# Patient Record
Sex: FEMALE | Race: ASIAN | NOT HISPANIC OR LATINO | URBAN - METROPOLITAN AREA
[De-identification: names, ages, dates, MRNs, and addresses within clinical notes are randomized per-mention and may not be internally consistent; named-entity substitution may affect disease eponyms.]

---
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
## 2022-07-06 NOTE — ED PEDIATRIC TRIAGE NOTE - NS ED NURSE DIRECT TO ROOM YN
Your labs are normal.  Your EKG is normal.  Leg swelling may be due to heat.  Wear compression stockings, keep legs elevated, exercises also beneficial.   
Yes

## 2022-07-07 ENCOUNTER — EMERGENCY (EMERGENCY)
Facility: HOSPITAL | Age: 22
LOS: 1 days | Discharge: PSYCHIATRIC OR SUBSTANCE USE FACILITY | End: 2022-07-07
Attending: EMERGENCY MEDICINE | Admitting: EMERGENCY MEDICINE
Payer: COMMERCIAL

## 2022-07-07 VITALS
DIASTOLIC BLOOD PRESSURE: 65 MMHG | TEMPERATURE: 98 F | RESPIRATION RATE: 16 BRPM | HEART RATE: 95 BPM | OXYGEN SATURATION: 98 % | SYSTOLIC BLOOD PRESSURE: 116 MMHG

## 2022-07-07 VITALS
HEIGHT: 68.9 IN | OXYGEN SATURATION: 97 % | SYSTOLIC BLOOD PRESSURE: 128 MMHG | DIASTOLIC BLOOD PRESSURE: 74 MMHG | WEIGHT: 193.35 LBS | TEMPERATURE: 99 F | HEART RATE: 115 BPM | RESPIRATION RATE: 17 BRPM

## 2022-07-07 DIAGNOSIS — F32.A DEPRESSION, UNSPECIFIED: ICD-10-CM

## 2022-07-07 LAB
ALBUMIN SERPL ELPH-MCNC: 4.3 G/DL — SIGNIFICANT CHANGE UP (ref 3.3–5)
ALP SERPL-CCNC: 56 U/L — SIGNIFICANT CHANGE UP (ref 30–120)
ALT FLD-CCNC: 38 U/L DA — SIGNIFICANT CHANGE UP (ref 10–60)
AMPHET UR-MCNC: NEGATIVE — SIGNIFICANT CHANGE UP
ANION GAP SERPL CALC-SCNC: 13 MMOL/L — SIGNIFICANT CHANGE UP (ref 5–17)
APAP SERPL-MCNC: <1 — SIGNIFICANT CHANGE UP (ref 10–30)
APPEARANCE UR: CLEAR — SIGNIFICANT CHANGE UP
AST SERPL-CCNC: 14 U/L — SIGNIFICANT CHANGE UP (ref 10–40)
BACTERIA # UR AUTO: ABNORMAL
BARBITURATES UR SCN-MCNC: NEGATIVE — SIGNIFICANT CHANGE UP
BASOPHILS # BLD AUTO: 0.04 K/UL — SIGNIFICANT CHANGE UP (ref 0–0.2)
BASOPHILS NFR BLD AUTO: 0.4 % — SIGNIFICANT CHANGE UP (ref 0–2)
BENZODIAZ UR-MCNC: NEGATIVE — SIGNIFICANT CHANGE UP
BILIRUB SERPL-MCNC: 0.6 MG/DL — SIGNIFICANT CHANGE UP (ref 0.2–1.2)
BILIRUB UR-MCNC: NEGATIVE — SIGNIFICANT CHANGE UP
BUN SERPL-MCNC: 14 MG/DL — SIGNIFICANT CHANGE UP (ref 7–23)
CALCIUM SERPL-MCNC: 9.2 MG/DL — SIGNIFICANT CHANGE UP (ref 8.4–10.5)
CHLORIDE SERPL-SCNC: 102 MMOL/L — SIGNIFICANT CHANGE UP (ref 96–108)
CO2 SERPL-SCNC: 22 MMOL/L — SIGNIFICANT CHANGE UP (ref 22–31)
COCAINE METAB.OTHER UR-MCNC: NEGATIVE — SIGNIFICANT CHANGE UP
COLOR SPEC: YELLOW — SIGNIFICANT CHANGE UP
CREAT SERPL-MCNC: 0.81 MG/DL — SIGNIFICANT CHANGE UP (ref 0.5–1.3)
DIFF PNL FLD: ABNORMAL
EGFR: 107 ML/MIN/1.73M2 — SIGNIFICANT CHANGE UP
EOSINOPHIL # BLD AUTO: 0.22 K/UL — SIGNIFICANT CHANGE UP (ref 0–0.5)
EOSINOPHIL NFR BLD AUTO: 2 % — SIGNIFICANT CHANGE UP (ref 0–6)
EPI CELLS # UR: SIGNIFICANT CHANGE UP
ETHANOL SERPL-MCNC: <10 MG/DL — SIGNIFICANT CHANGE UP (ref 0–3)
GLUCOSE SERPL-MCNC: 98 MG/DL — SIGNIFICANT CHANGE UP (ref 70–99)
GLUCOSE UR QL: NEGATIVE MG/DL — SIGNIFICANT CHANGE UP
HCG UR QL: NEGATIVE — SIGNIFICANT CHANGE UP
HCT VFR BLD CALC: 39.1 % — SIGNIFICANT CHANGE UP (ref 34.5–45)
HGB BLD-MCNC: 13.1 G/DL — SIGNIFICANT CHANGE UP (ref 11.5–15.5)
IMM GRANULOCYTES NFR BLD AUTO: 0.4 % — SIGNIFICANT CHANGE UP (ref 0–1.5)
KETONES UR-MCNC: ABNORMAL
LEUKOCYTE ESTERASE UR-ACNC: ABNORMAL
LYMPHOCYTES # BLD AUTO: 27.4 % — SIGNIFICANT CHANGE UP (ref 13–44)
LYMPHOCYTES # BLD AUTO: 3.01 K/UL — SIGNIFICANT CHANGE UP (ref 1–3.3)
MCHC RBC-ENTMCNC: 28.9 PG — SIGNIFICANT CHANGE UP (ref 27–34)
MCHC RBC-ENTMCNC: 33.5 GM/DL — SIGNIFICANT CHANGE UP (ref 32–36)
MCV RBC AUTO: 86.1 FL — SIGNIFICANT CHANGE UP (ref 80–100)
METHADONE UR-MCNC: NEGATIVE — SIGNIFICANT CHANGE UP
MONOCYTES # BLD AUTO: 0.47 K/UL — SIGNIFICANT CHANGE UP (ref 0–0.9)
MONOCYTES NFR BLD AUTO: 4.3 % — SIGNIFICANT CHANGE UP (ref 2–14)
NEUTROPHILS # BLD AUTO: 7.2 K/UL — SIGNIFICANT CHANGE UP (ref 1.8–7.4)
NEUTROPHILS NFR BLD AUTO: 65.5 % — SIGNIFICANT CHANGE UP (ref 43–77)
NITRITE UR-MCNC: NEGATIVE — SIGNIFICANT CHANGE UP
NRBC # BLD: 0 /100 WBCS — SIGNIFICANT CHANGE UP (ref 0–0)
OPIATES UR-MCNC: NEGATIVE — SIGNIFICANT CHANGE UP
PCP SPEC-MCNC: SIGNIFICANT CHANGE UP
PCP UR-MCNC: NEGATIVE — SIGNIFICANT CHANGE UP
PH UR: 6 — SIGNIFICANT CHANGE UP (ref 5–8)
PLATELET # BLD AUTO: 318 K/UL — SIGNIFICANT CHANGE UP (ref 150–400)
POTASSIUM SERPL-MCNC: 3.3 MMOL/L — LOW (ref 3.5–5.3)
POTASSIUM SERPL-SCNC: 3.3 MMOL/L — LOW (ref 3.5–5.3)
PROT SERPL-MCNC: 7.7 G/DL — SIGNIFICANT CHANGE UP (ref 6–8.3)
PROT UR-MCNC: 15 MG/DL
RBC # BLD: 4.54 M/UL — SIGNIFICANT CHANGE UP (ref 3.8–5.2)
RBC # FLD: 12.2 % — SIGNIFICANT CHANGE UP (ref 10.3–14.5)
RBC CASTS # UR COMP ASSIST: ABNORMAL /HPF (ref 0–4)
SALICYLATES SERPL-MCNC: 0.6 MG/DL — LOW (ref 2.8–20)
SARS-COV-2 RNA SPEC QL NAA+PROBE: SIGNIFICANT CHANGE UP
SODIUM SERPL-SCNC: 137 MMOL/L — SIGNIFICANT CHANGE UP (ref 135–145)
SP GR SPEC: 1.01 — SIGNIFICANT CHANGE UP (ref 1.01–1.02)
THC UR QL: NEGATIVE — SIGNIFICANT CHANGE UP
UROBILINOGEN FLD QL: NEGATIVE MG/DL — SIGNIFICANT CHANGE UP
WBC # BLD: 10.98 K/UL — HIGH (ref 3.8–10.5)
WBC # FLD AUTO: 10.98 K/UL — HIGH (ref 3.8–10.5)
WBC UR QL: ABNORMAL

## 2022-07-07 PROCEDURE — 81001 URINALYSIS AUTO W/SCOPE: CPT

## 2022-07-07 PROCEDURE — 80053 COMPREHEN METABOLIC PANEL: CPT

## 2022-07-07 PROCEDURE — 99285 EMERGENCY DEPT VISIT HI MDM: CPT

## 2022-07-07 PROCEDURE — 81025 URINE PREGNANCY TEST: CPT

## 2022-07-07 PROCEDURE — 99053 MED SERV 10PM-8AM 24 HR FAC: CPT

## 2022-07-07 PROCEDURE — 36415 COLL VENOUS BLD VENIPUNCTURE: CPT

## 2022-07-07 PROCEDURE — 93010 ELECTROCARDIOGRAM REPORT: CPT

## 2022-07-07 PROCEDURE — 80307 DRUG TEST PRSMV CHEM ANLYZR: CPT

## 2022-07-07 PROCEDURE — 87086 URINE CULTURE/COLONY COUNT: CPT

## 2022-07-07 PROCEDURE — 93005 ELECTROCARDIOGRAM TRACING: CPT

## 2022-07-07 PROCEDURE — 87635 SARS-COV-2 COVID-19 AMP PRB: CPT

## 2022-07-07 PROCEDURE — 85025 COMPLETE CBC W/AUTO DIFF WBC: CPT

## 2022-07-07 RX ORDER — OMEPRAZOLE 10 MG/1
1 CAPSULE, DELAYED RELEASE ORAL
Qty: 0 | Refills: 0 | DISCHARGE

## 2022-07-07 RX ORDER — QUETIAPINE FUMARATE 200 MG/1
0 TABLET, FILM COATED ORAL
Qty: 0 | Refills: 0 | DISCHARGE

## 2022-07-07 RX ORDER — ACETAMINOPHEN 500 MG
650 TABLET ORAL ONCE
Refills: 0 | Status: COMPLETED | OUTPATIENT
Start: 2022-07-07 | End: 2022-07-07

## 2022-07-07 RX ORDER — POTASSIUM CHLORIDE 20 MEQ
20 PACKET (EA) ORAL ONCE
Refills: 0 | Status: COMPLETED | OUTPATIENT
Start: 2022-07-07 | End: 2022-07-07

## 2022-07-07 RX ADMIN — Medication 20 MILLIEQUIVALENT(S): at 05:42

## 2022-07-07 RX ADMIN — Medication 650 MILLIGRAM(S): at 11:24

## 2022-07-07 NOTE — ED PROVIDER NOTE - SKIN, MLM
Skin normal color for race, warm, dry and intact. No evidence of rash. Skin normal color for race, warm, dry and intact. No evidence of rash. Multiple superficial linear abrasions on volar aspect of dital left forearm

## 2022-07-07 NOTE — ED BEHAVIORAL HEALTH ASSESSMENT NOTE - HPI (INCLUDE ILLNESS QUALITY, SEVERITY, DURATION, TIMING, CONTEXT, MODIFYING FACTORS, ASSOCIATED SIGNS AND SYMPTOMS)
Patient is a 22 yo F, has boyfriend who lives in [place], domiciled with mother and father in [place], [place], in private residence, enrolled in college, with psychiatric history of depression and unspecified anxiety, no PPH, no self-reported suicide attempts but history of suicidal gestures (reports taking a bottle of Ambien and Tylenol in March 2022 so she could "fall asleep"), +SIB by cutting, no known h/o violence, trauma, substance use, or legal issues, PMH significant for asthma, in outpatient treatment with Dr. Martines and a therapist (does not remember clinician's name since they met only twice), on Lorazepam 1 mg and quetiapine 300 mg, BIB mother after ingesting 10-12 tablets of Ativan 1 mg and cutting her arm.     On assessment, pt reports wanting to die earlier [facility] when feeling distressed, at which point she cut her arm before ingesting up to 12 mg of Ativan since that "wouldn't take as long". However, she then reports knowing this amount of Ativan would not end her life, but instead allow her to fall asleep. After ingesting the pills, she reports disclosing to her mom what she had done, who then "forced [her]" to come to the ED, which the patient did not want to do. The patient reports her overdose and self-injurious behavior were done impulsively and not previously planned, but does endorse having thoughts of killing herself by cutting herself or overdosing every other day. [facility]esting a "bottle" each of Tylenol and Ambien in March 2022, at which time she was taken to the ED, but did not require medical hospitalization. She also reports not having spoken with psychiatry during that visit, but states that she did not overdose then with the intention to end her life but that this was done "unconsciously" and did not remember the incident until she saw the empty bottles the following morning. The patient reports feeling stressed recently because her planned trip to [place] with her boyfriend at the end of the month fell through and that she feels inundated with "problems" that are difficult to solve. She reports having a positive therapeutic relationship with her psychiatrist, but admits to not having told her the extent of her SI or suicidal behaviors.     The patient denies HI, A/VH, PI, camilo, or substance use.     This writer discussed hospitalization with the patient and after speaking with her mother about this plan, agreed to sign in voluntarily.

## 2022-07-07 NOTE — ED ADULT NURSE REASSESSMENT NOTE - GENERAL PATIENT STATE
mother present/family/SO at bedside
pt offered breakfast, declined at this time.
comfortable appearance/cooperative

## 2022-07-07 NOTE — ED PROVIDER NOTE - CLINICAL SUMMARY MEDICAL DECISION MAKING FREE TEXT BOX
Patient with anxiety and depression took pills and ut herself. Will get medical clearance and psych eval

## 2022-07-07 NOTE — ED BEHAVIORAL HEALTH ASSESSMENT NOTE - RISK ASSESSMENT
Moderate Acute Suicide Risk Patient has multiple risk factors that elevate her risk of harm to self including recent suicidal and self-injurious behaviors, recurrent active SI with method, impulsivity, and self-reported depressive symptoms. Her protective factors are no e/o psychosis, no HI, no e/o psychosis or camilo, in outpatient treatment, support from family, and h/o no substance abuse. Given the above, this writer considers the patient to be  in acute risk of harm to self and requires inpatient hospitalization for safety.

## 2022-07-07 NOTE — ED BEHAVIORAL HEALTH ASSESSMENT NOTE - SUMMARY
Patient is a 20 yo F, has boyfriend who lives in [place], domiciled with mother and father in [place], [place], in private residence, enrolled in college, with psychiatric history of depression and unspecified anxiety, no PPH, no self-reported suicide attempts but history of suicidal gestures (reports taking a bottle of Ambien and Tylenol in March 2022 so she could "fall asleep"), +SIB by cutting, no known h/o violence, trauma, substance use, or legal issues, PMH significant for asthma, in outpatient treatment with Dr. Martines and a therapist (does not remember clinician's name since they met only twice), on Lorazepam 1 mg and quetiapine 300 mg, BIB mother after ingesting 10-12 tablets of Ativan 1 mg and cutting her arm. Patient reports having carried out the above actions earlier tonight in the setting of wanting to die and endorses having active SI every other day with thoughts of cutting herself or overdosing for the past several months. In addition to tonight's events, she reports a history of self injurious and suicidal behavior, which she has not disclosed to her psychiatrist. Curiously, despite pt's self-reported benzo overdose and adherence with her prescribed dose of Ativan 1 mg at bedtime, her utox in the ED was negative for benzos. Despite this, the patient's reported recurrent active SI, impulsivity, recent SIB, and suicidal gestures elevate her risk of harm to self and she would benefit form hospitalization for safety and stabilization. Patient has agreed to sign in voluntarily, but if she recants, she meets criteria for involuntary admission. Plan discussed with ED attending, Dr. Tomlinson.

## 2022-07-07 NOTE — ED ADULT NURSE NOTE - HPI (INCLUDE ILLNESS QUALITY, SEVERITY, DURATION, TIMING, CONTEXT, MODIFYING FACTORS, ASSOCIATED SIGNS AND SYMPTOMS)
pt reports she took "10 Lorazepam pills about 1 and 1/2 hours prior to arrival; 1 mg tabs." pt reports "I can't control my emotions" pt also presents with superficial "cuts" to her left wrist. no bleeding noted; pt self reports h/o of "accidental" ingestion of ambient and Tylenol in the past, but denied suicidal intent. pt brought to ED by her mother. belongings given to mother (Cell phone, clothing, wallet and jewelry). pt placed on 1:1 observation for pt safety

## 2022-07-07 NOTE — ED ADULT NURSE NOTE - TEMPLATE
Normal XR, normal CT scan  Referral for orthopedics  Prescriptions  Please follow up with your PCP to have blood pressure rechecked   Psych/Behavioral

## 2022-07-07 NOTE — ED ADULT TRIAGE NOTE - CHIEF COMPLAINT QUOTE
I have depression and sometimes I can not control what I am doing and I took 10pills of lorazepam 1mg. I also cut my wrist yesterday because I was so mad and I don't have anywhere to take out the anger. I kind of still want to hurt myself.

## 2022-07-07 NOTE — ED BEHAVIORAL HEALTH ASSESSMENT NOTE - DETAILS
This writer did not speak with patient's mother but patient discussed plan with her. No bed available yet See HPI

## 2022-07-07 NOTE — ED BEHAVIORAL HEALTH ASSESSMENT NOTE - PSYCHIATRIC ISSUES AND PLAN (INCLUDE STANDING AND PRN MEDICATION)
Hold off on restarting outpatient regimen Ativan/Seroquel in light of recent self reported overdose. Maintain on 1:1 while awaiting inpatient bed.

## 2022-07-07 NOTE — ED BEHAVIORAL HEALTH ASSESSMENT NOTE - DIFFERENTIAL
Unspecified depression; R/O persistent depressive disorder vs characterological depression vs MDD, recurrent.   Borderline personality organization with borderline traits

## 2022-07-07 NOTE — ED BEHAVIORAL HEALTH ASSESSMENT NOTE - DESCRIPTION
Asthma Has boyfriend in [place]. Lives with parents in [place], [place], and commutes to [facility] Since arriving to the ED, the patient has been maintained on 1:1 and has remained in good behavioral control. Curiously, her utox was negative for benzodiazepines despite the patient's above report of her benzo overdose and being adherent with Ativan 1 mg at bedtime prior to today.

## 2022-07-07 NOTE — ED PROVIDER NOTE - CHPI ED SYMPTOMS NEG
no agitation/no disorientation/no hallucinations/no homicidal/no weakness/no weight loss/no change in level of consciousness/no paranoia

## 2022-07-07 NOTE — ED ADULT NURSE NOTE - NSICDXPASTMEDICALHX_GEN_ALL_CORE_FT
PAST MEDICAL HISTORY:  Anxiety     Asthma     Depression     Enthesopathy     Seasonal allergies

## 2022-07-07 NOTE — ED BEHAVIORAL HEALTH NOTE - BEHAVIORAL HEALTH NOTE
ED AM Reassessment     Patient waiting for inpatient psych bed; remains on 9.13 status. Patient AAOx4, calm and cooperative in ED + resting in bed comfortably. Mother is at bedside. Patient remains on 1:1; remains in good behavioral control. ED AM Reassessment     Patient waiting for inpatient psych bed; remains on 9.13 status. Patient AAOx4, calm and cooperative in ED + resting in bed comfortably. Mother is at bedside. Patient remains on 1:1; remains in good behavioral control.      Attending addendum:  Patient reassessed this morning. She expresses ambivalence about admission, stating she feels tired and would like to go home, but ultimately agrees to stay. She continues to appear very dysphoric and flat. With regard to her behavior last night, she reports thinking "I don't care if I do" and leaving the outcome up to God. States if she could go back, she would probably take the same action, although also stating that right now she is not having active thoughts about dying or harming herself. She reports this overdose was intentional, whereas her previous overdose on Ambien and Ativan Tylenol in March 2022 was not.    Plan:  - voluntary admit 9.13 pending bed availability  - outpatient meds include omeprazole PRN, asthma inhaler PRN, oral contraceptive, and Seroquel 300mg BID  - recommend holding home meds for now. Defer to inpatient team.

## 2022-07-07 NOTE — ED PROVIDER NOTE - PROGRESS NOTE DETAILS
telepsych relates pt to be xferred to [facility] for dr catherine pt no longer wants to sign voluntary, d/w telepsych, they relate patient needs to be admitted involuntary status

## 2022-07-07 NOTE — ED ADULT NURSE REASSESSMENT NOTE - NS ED NURSE REASSESS COMMENT FT1
pt updated on POC, questions answered. reporting she doesn't recall what happened last night leading her to come to ED. pt offered food/drink, still declines. c/o headache. MD made aware
spoke with pt's mother; Mandarin  Jose Francisco ([phone]) used for interview. pt's mother reports pt told her she "took too much medication"; she reports pt had/has "hard time sleeping" and she denied pt endorsed suicidal ideations. she reports pt had "an argument with her boyfriend (lives in [place]); and the father was yelling" yesterday and that upset the pt. pt's mother also reports pt it an episode of "took too much medication" around February or March of this year. she reports pt has not had any psychiatric hospital admissions. she states the "school" diagnosed her with depression and that the pt takes care of her own medications. explained to pt's mother the procedure for evaluation. she reports she will stay in waiting room until she is needed. all questions answered
transfer center called to make pt aware her insurance was not showing active, will have to agree to pay out of pocket upon arrival to [facility]. mother and pt made aware, insurance company called by patient, claims number [id number], insurance verified as active. transfer center made aware. attempt made to call report at [facility]., no answerx2.
pt asking if she has to go to [facility], reports "my mom can just take me home". ED provider Maximus made aware. provider to bedside for update and answering all questions.

## 2022-07-07 NOTE — ED ADULT NURSE REASSESSMENT NOTE - DESCRIPTION
received pt from night shift RN, pt remains on 1:1 observation. pt laying in stretcher, awake, calm, cooperative. a&ox4. pt requesting to call mother, phone provided. pt without any complaints at this time. multiple superficial lacerations to left wrist present, open to air, no bleeding/drainage present. pt updated on plan, awaiting in-pt psych bed. will continue to monitor.

## 2022-07-07 NOTE — ED PROVIDER NOTE - OBJECTIVE STATEMENT
Patient states she took 10 x 1 mg lorazepam about an hour and a half ago. Patient states she took them because of all the stress at home. Initially patient stated she doesn't really want to die, but admits that she believed taking the pills could kill her. Now, when asked if she wants to die, she replied "Not much, maybe a little." Patient also cut her left wrist with a knife. States she has done that before, but never this deep    Patient has a history of anxiety and depression, and is on quetiapine and lorazepam. Followed by Dr. Ammon Martines of psychiatry who she sees monthly. Denies past admission, but states she had one prior overdose of Ambien and Tylenol which was "accidental".    Patient brought by mother who "guessed" patient had taken the overdose, as she didn't seem right.

## 2022-07-08 LAB
CULTURE RESULTS: SIGNIFICANT CHANGE UP
SPECIMEN SOURCE: SIGNIFICANT CHANGE UP

## 2023-02-28 NOTE — ED ADULT TRIAGE NOTE - MODE OF ARRIVAL
Walk in Private Auto Birth Control Pills Pregnancy And Lactation Text: This medication should be avoided if pregnant and for the first 30 days post-partum.

## 2024-12-28 NOTE — ED PROVIDER NOTE - NS HIV RISK FACTOR YES
Subjective:  Resting comfortably    Objective:    Vital signs  T(C): , Max: 37.2 (12-28-24 @ 05:45)  HR: 55 (12-28-24 @ 05:45)  BP: 104/55 (12-28-24 @ 05:45)  SpO2: 100% (12-28-24 @ 05:45)  Wt(kg): --    Output     12-27 @ 07:01  -  12-28 @ 07:00  --------------------------------------------------------  IN: 400 mL / OUT: 2600 mL / NET: -2200 mL        Gen: NAD  Abd: soft, nontender, nondistended  : sierra secured in place, draining CYU    Labs                        8.6    5.69  )-----------( 153      ( 28 Dec 2024 07:20 )             27.8     28 Dec 2024 07:20    137    |  99     |  64     ----------------------------<  234    4.0     |  25     |  4.15     Ca    8.5        28 Dec 2024 07:20  Phos  3.3       28 Dec 2024 07:20  Mg     1.90      28 Dec 2024 07:20        Urine Cx: ?  Blood Cx: ?      Imaging Subjective:  Resting comfortably, suprapubic discomfort improving    Objective:    Vital signs  T(C): , Max: 37.2 (12-28-24 @ 05:45)  HR: 55 (12-28-24 @ 05:45)  BP: 104/55 (12-28-24 @ 05:45)  SpO2: 100% (12-28-24 @ 05:45)  Wt(kg): --    Output     12-27 @ 07:01  -  12-28 @ 07:00  --------------------------------------------------------  IN: 400 mL / OUT: 2600 mL / NET: -2200 mL        Gen: NAD  Abd: soft, nontender, nondistended  : sierra secured in place, draining sedimentous yellow urine, clearing up proximally, no CVAT bilaterally    Labs                        8.6    5.69  )-----------( 153      ( 28 Dec 2024 07:20 )             27.8     28 Dec 2024 07:20    137    |  99     |  64     ----------------------------<  234    4.0     |  25     |  4.15     Ca    8.5        28 Dec 2024 07:20  Phos  3.3       28 Dec 2024 07:20  Mg     1.90      28 Dec 2024 07:20        Urine Cx: ?  Blood Cx: ?      Imaging Declined